# Patient Record
Sex: FEMALE | Race: WHITE | URBAN - METROPOLITAN AREA
[De-identification: names, ages, dates, MRNs, and addresses within clinical notes are randomized per-mention and may not be internally consistent; named-entity substitution may affect disease eponyms.]

---

## 2023-12-11 ENCOUNTER — OFFICE VISIT (OUTPATIENT)
Age: 48
End: 2023-12-11

## 2023-12-11 VITALS
DIASTOLIC BLOOD PRESSURE: 84 MMHG | OXYGEN SATURATION: 98 % | WEIGHT: 150 LBS | RESPIRATION RATE: 16 BRPM | HEART RATE: 75 BPM | TEMPERATURE: 98.2 F | SYSTOLIC BLOOD PRESSURE: 126 MMHG

## 2023-12-11 DIAGNOSIS — N39.0 URINARY TRACT INFECTION WITHOUT HEMATURIA, SITE UNSPECIFIED: Primary | ICD-10-CM

## 2023-12-11 LAB
BILIRUBIN, POC: NEGATIVE
BLOOD URINE, POC: NEGATIVE
CLARITY, POC: ABNORMAL
COLOR, POC: YELLOW
GLUCOSE URINE, POC: NEGATIVE
KETONES, POC: NEGATIVE
LEUKOCYTE EST, POC: ABNORMAL
NITRITE, POC: ABNORMAL
PH, POC: 7
PROTEIN, POC: NEGATIVE
SPECIFIC GRAVITY, POC: 1.01
UROBILINOGEN, POC: 0.2

## 2023-12-11 RX ORDER — FLUCONAZOLE 150 MG/1
TABLET ORAL
Qty: 2 TABLET | Refills: 0 | Status: SHIPPED | OUTPATIENT
Start: 2023-12-11

## 2023-12-11 RX ORDER — SULFAMETHOXAZOLE AND TRIMETHOPRIM 800; 160 MG/1; MG/1
1 TABLET ORAL 2 TIMES DAILY
Qty: 14 TABLET | Refills: 0 | Status: SHIPPED | OUTPATIENT
Start: 2023-12-11 | End: 2023-12-18

## 2023-12-11 ASSESSMENT — ENCOUNTER SYMPTOMS
DIARRHEA: 0
SHORTNESS OF BREATH: 0
BACK PAIN: 0
COUGH: 0
ABDOMINAL PAIN: 0
NAUSEA: 0
VOMITING: 0
SORE THROAT: 0

## 2024-01-09 ENCOUNTER — OFFICE VISIT (OUTPATIENT)
Age: 49
End: 2024-01-09

## 2024-01-09 VITALS
OXYGEN SATURATION: 95 % | HEART RATE: 85 BPM | WEIGHT: 144.2 LBS | TEMPERATURE: 97.9 F | RESPIRATION RATE: 20 BRPM | DIASTOLIC BLOOD PRESSURE: 80 MMHG | HEIGHT: 64 IN | BODY MASS INDEX: 24.62 KG/M2 | SYSTOLIC BLOOD PRESSURE: 164 MMHG

## 2024-01-09 DIAGNOSIS — N39.0 URINARY TRACT INFECTION WITHOUT HEMATURIA, SITE UNSPECIFIED: Primary | ICD-10-CM

## 2024-01-09 LAB
BILIRUBIN, POC: ABNORMAL
BLOOD URINE, POC: ABNORMAL
CLARITY, POC: ABNORMAL
COLOR, POC: ABNORMAL
GLUCOSE URINE, POC: ABNORMAL
KETONES, POC: ABNORMAL
LEUKOCYTE EST, POC: ABNORMAL
NITRITE, POC: ABNORMAL
PH, POC: 6
PROTEIN, POC: ABNORMAL
SPECIFIC GRAVITY, POC: 1.02
UROBILINOGEN, POC: 0.2

## 2024-01-09 RX ORDER — CIPROFLOXACIN 500 MG/1
500 TABLET, FILM COATED ORAL 2 TIMES DAILY
Qty: 14 TABLET | Refills: 0 | Status: SHIPPED | OUTPATIENT
Start: 2024-01-09 | End: 2024-01-16

## 2024-01-09 ASSESSMENT — ENCOUNTER SYMPTOMS
BACK PAIN: 0
VOMITING: 0
DIARRHEA: 0
ABDOMINAL DISTENTION: 0
EYE REDNESS: 0
COUGH: 0
ABDOMINAL PAIN: 0
SORE THROAT: 0
NAUSEA: 0

## 2024-01-09 NOTE — PROGRESS NOTES
Katty Mccauley (:  1975) is a 48 y.o. female,Established patient, here for evaluation of the following chief complaint(s):  Urinary Tract Infection (Pt c/o frequency and painful urination )      ASSESSMENT/PLAN:  1. Urinary tract infection without hematuria, site unspecified    - POCT Urinalysis no Micro  - ciprofloxacin (CIPRO) 500 MG tablet; Take 1 tablet by mouth 2 times daily for 7 days  Dispense: 14 tablet; Refill: 0     Pt was advised to increase water intake,f/u with her PCP    No follow-ups on file.    SUBJECTIVE/OBJECTIVE:    History provided by:  Patient  Urinary Tract Infection  This is a recurrent problem. The current episode started 1 to 4 weeks ago. The problem has been gradually worsening since onset. Pertinent negatives include no hematuria.       Vitals:    24 1412   BP: (!) 164/80   Pulse: 85   Resp: 20   Temp: 97.9 °F (36.6 °C)   SpO2: 95%   Weight: 65.4 kg (144 lb 3.2 oz)   Height: 1.626 m (5' 4\")       Review of Systems   Constitutional:  Negative for activity change, appetite change and chills.   HENT:  Negative for congestion and sore throat.    Eyes:  Negative for redness.   Respiratory:  Negative for cough.    Cardiovascular:  Negative for chest pain and leg swelling.   Gastrointestinal:  Negative for abdominal distention, abdominal pain, diarrhea, nausea and vomiting.   Genitourinary:  Positive for dysuria, frequency and urgency. Negative for difficulty urinating, dyspareunia, flank pain, genital sores, hematuria, vaginal discharge and vaginal pain.   Musculoskeletal:  Negative for back pain.   Skin:  Negative for rash.       Physical Exam  Constitutional:       General: She is not in acute distress.  HENT:      Nose: No congestion.      Mouth/Throat:      Mouth: Mucous membranes are moist.      Pharynx: No oropharyngeal exudate or posterior oropharyngeal erythema.   Eyes:      Conjunctiva/sclera: Conjunctivae normal.      Pupils: Pupils are equal, round, and reactive to light.

## 2024-02-20 ENCOUNTER — OFFICE VISIT (OUTPATIENT)
Age: 49
End: 2024-02-20

## 2024-02-20 VITALS
HEART RATE: 70 BPM | WEIGHT: 143.8 LBS | RESPIRATION RATE: 18 BRPM | OXYGEN SATURATION: 96 % | DIASTOLIC BLOOD PRESSURE: 84 MMHG | TEMPERATURE: 98 F | SYSTOLIC BLOOD PRESSURE: 137 MMHG | BODY MASS INDEX: 24.68 KG/M2

## 2024-02-20 DIAGNOSIS — M26.629 TMJ SYNDROME: Primary | ICD-10-CM

## 2024-02-20 RX ORDER — METOPROLOL TARTRATE 50 MG/1
TABLET, FILM COATED ORAL
COMMUNITY
Start: 2024-01-27

## 2024-02-20 RX ORDER — PRAVASTATIN SODIUM 80 MG/1
TABLET ORAL
COMMUNITY
Start: 2024-02-12

## 2024-02-20 RX ORDER — NAPROXEN 500 MG/1
500 TABLET ORAL 2 TIMES DAILY WITH MEALS
Qty: 60 TABLET | Refills: 0 | Status: SHIPPED | OUTPATIENT
Start: 2024-02-20

## 2024-02-20 ASSESSMENT — ENCOUNTER SYMPTOMS
COUGH: 0
ABDOMINAL PAIN: 0
EYE REDNESS: 0
VOMITING: 0
TROUBLE SWALLOWING: 0
VOICE CHANGE: 0
DIARRHEA: 0
SORE THROAT: 0
RHINORRHEA: 0
CHEST TIGHTNESS: 0

## 2024-02-20 NOTE — PROGRESS NOTES
Katty Mccauley (:  1975) is a 49 y.o. female,Established patient, here for evaluation of the following chief complaint(s):  Otalgia (Pt c/o lft ear pain x 2 days )      ASSESSMENT/PLAN:  1. TMJ syndrome    - naproxen (NAPROSYN) 500 MG tablet; Take 1 tablet by mouth 2 times daily (with meals)  Dispense: 60 tablet; Refill: 0     -f/u with her PCP if not better,return to  if worsening symptoms  No follow-ups on file.    SUBJECTIVE/OBJECTIVE:    History provided by:  Patient  Otalgia   There is pain in the left ear. This is a new problem. The current episode started in the past 7 days. The problem has been unchanged. There has been no fever. The pain is mild. Pertinent negatives include no abdominal pain, coughing, diarrhea, ear discharge, headaches, hearing loss, neck pain, rash, rhinorrhea, sore throat or vomiting. She has tried nothing for the symptoms.       Vitals:    24 1442   BP: 137/84   Pulse: 70   Resp: 18   Temp: 98 °F (36.7 °C)   SpO2: 96%   Weight: 65.2 kg (143 lb 12.8 oz)       Review of Systems   Constitutional:  Negative for activity change, appetite change, fatigue and fever.   HENT:  Positive for ear pain. Negative for congestion, ear discharge, hearing loss, rhinorrhea, sore throat, tinnitus, trouble swallowing and voice change.    Eyes:  Negative for redness.   Respiratory:  Negative for cough and chest tightness.    Cardiovascular:  Negative for chest pain.   Gastrointestinal:  Negative for abdominal pain, diarrhea and vomiting.   Musculoskeletal:  Negative for neck pain.   Skin:  Negative for rash.   Neurological:  Negative for dizziness and headaches.       Physical Exam  Constitutional:       General: She is not in acute distress.  HENT:      Right Ear: There is no impacted cerumen. Tympanic membrane is not erythematous or bulging.      Left Ear: There is no impacted cerumen. Tympanic membrane is not erythematous or bulging.      Ears:      Comments: Tender Left TMJ     Nose: No

## 2024-07-25 ENCOUNTER — OFFICE VISIT (OUTPATIENT)
Age: 49
End: 2024-07-25

## 2024-07-25 VITALS
HEART RATE: 82 BPM | WEIGHT: 140 LBS | BODY MASS INDEX: 24.03 KG/M2 | TEMPERATURE: 98.4 F | SYSTOLIC BLOOD PRESSURE: 123 MMHG | OXYGEN SATURATION: 94 % | DIASTOLIC BLOOD PRESSURE: 79 MMHG

## 2024-07-25 DIAGNOSIS — L03.90 CELLULITIS, UNSPECIFIED CELLULITIS SITE: ICD-10-CM

## 2024-07-25 DIAGNOSIS — L25.9 CONTACT DERMATITIS, UNSPECIFIED CONTACT DERMATITIS TYPE, UNSPECIFIED TRIGGER: Primary | ICD-10-CM

## 2024-07-25 RX ORDER — SULFAMETHOXAZOLE AND TRIMETHOPRIM 800; 160 MG/1; MG/1
1 TABLET ORAL 2 TIMES DAILY
Qty: 14 TABLET | Refills: 0 | Status: SHIPPED | OUTPATIENT
Start: 2024-07-25 | End: 2024-08-01

## 2024-07-25 RX ORDER — TRIAMCINOLONE ACETONIDE 0.25 MG/G
CREAM TOPICAL
Qty: 15 G | Refills: 0 | Status: SHIPPED | OUTPATIENT
Start: 2024-07-25

## 2024-07-25 RX ORDER — FLUCONAZOLE 150 MG/1
150 TABLET ORAL
Qty: 2 TABLET | Refills: 0 | Status: SHIPPED | OUTPATIENT
Start: 2024-07-25 | End: 2024-07-31

## 2024-07-25 NOTE — PROGRESS NOTES
Katty Mccauley (:  1975) is a 49 y.o. female,Established patient, here for evaluation of the following chief complaint(s):  Rash (Rash on abdomen - dr prescribed nystatin with no improvement - x 5-6 days)      Assessment & Plan :  Visit Diagnoses and Associated Orders       Contact dermatitis, unspecified contact dermatitis type, unspecified trigger    -  Primary    triamcinolone (KENALOG) 0.025 % cream [8112]           Cellulitis, unspecified cellulitis site        sulfamethoxazole-trimethoprim (BACTRIM DS;SEPTRA DS) 800-160 MG per tablet [88127]           ORDERS WITHOUT AN ASSOCIATED DIAGNOSIS    fluconazole (DIFLUCAN) 150 MG tablet [91553]             Do not scratch the rash. Cut your nails short, and file them smooth. Or wear gloves if this helps keep you from scratching.  Wash the area with water only. Pat dry.  Put cold, wet cloths on the rash to reduce itching.  Keep cool, and stay out of the sun.  Leave the rash open to the air as much as possible       Subjective :  Rash (Rash on abdomen - dr prescribed nystatin with no improvement - x 5-6 days)        History provided by:  Patient  Rash  This is a new problem. The current episode started in the past 7 days. The problem has been gradually worsening since onset. The affected locations include the abdomen (under breast). The rash is characterized by burning, itchiness, pain and redness. She was exposed to nothing. Pertinent negatives include no facial edema, fatigue, fever or joint pain. Treatments tried: nystatin cream. The treatment provided no relief.       49 y.o. female presents with symptoms of dermatitis between and under breast.          Vitals:    24 1130   BP: 123/79   Site: Right Upper Arm   Position: Sitting   Cuff Size: Medium Adult   Pulse: 82   Temp: 98.4 °F (36.9 °C)   TempSrc: Oral   SpO2: 94%   Weight: 63.5 kg (140 lb)       No results found for this visit on 24.      Objective   Physical Exam  Constitutional:

## 2024-07-25 NOTE — PATIENT INSTRUCTIONS
Discharge medications reviewed with the patient and appropriate educational materials and side effects teaching were provided.    Do not scratch the rash. Cut your nails short, and file them smooth. Or wear gloves if this helps keep you from scratching.  Wash the area with water only. Pat dry.  Put cold, wet cloths on the rash to reduce itching.  Keep cool, and stay out of the sun.  Leave the rash open to the air as much as possible    Complete the full course of antibiotics . Eat a yogurt daily while on antibiotics to prevent yeast infection. Use a second form of birth control while on antibiotics, as antibiotics often interfere with birth control effectiveness.

## 2025-01-29 ENCOUNTER — OFFICE VISIT (OUTPATIENT)
Age: 50
End: 2025-01-29
Payer: COMMERCIAL

## 2025-01-29 VITALS
SYSTOLIC BLOOD PRESSURE: 125 MMHG | HEIGHT: 64 IN | BODY MASS INDEX: 24.24 KG/M2 | TEMPERATURE: 96.8 F | WEIGHT: 141.98 LBS | HEART RATE: 78 BPM | OXYGEN SATURATION: 98 % | DIASTOLIC BLOOD PRESSURE: 80 MMHG

## 2025-01-29 DIAGNOSIS — R42 DIZZINESS: Primary | ICD-10-CM

## 2025-01-29 PROCEDURE — G8420 CALC BMI NORM PARAMETERS: HCPCS | Performed by: OTOLARYNGOLOGY

## 2025-01-29 PROCEDURE — 1036F TOBACCO NON-USER: CPT | Performed by: OTOLARYNGOLOGY

## 2025-01-29 PROCEDURE — G8427 DOCREV CUR MEDS BY ELIG CLIN: HCPCS | Performed by: OTOLARYNGOLOGY

## 2025-01-29 PROCEDURE — 99204 OFFICE O/P NEW MOD 45 MIN: CPT | Performed by: OTOLARYNGOLOGY

## 2025-01-29 PROCEDURE — 3017F COLORECTAL CA SCREEN DOC REV: CPT | Performed by: OTOLARYNGOLOGY

## 2025-01-29 RX ORDER — HYDROCHLOROTHIAZIDE 12.5 MG/1
12.5 CAPSULE ORAL DAILY PRN
COMMUNITY

## 2025-01-29 RX ORDER — LAMOTRIGINE 25 MG/1
TABLET ORAL
COMMUNITY
Start: 2025-01-03

## 2025-01-29 RX ORDER — MAGNESIUM OXIDE 400 MG/1
400 TABLET ORAL 2 TIMES DAILY
COMMUNITY
Start: 2024-06-23

## 2025-01-29 RX ORDER — CALCIUM CITRATE/VITAMIN D3 200MG-6.25
TABLET ORAL
COMMUNITY
Start: 2025-01-25

## 2025-01-29 RX ORDER — NYSTATIN 100000 U/G
30 CREAM TOPICAL PRN
COMMUNITY
Start: 2024-07-16

## 2025-01-29 RX ORDER — HYDROXYZINE HYDROCHLORIDE 10 MG/1
TABLET, FILM COATED ORAL
COMMUNITY
Start: 2024-12-31

## 2025-01-29 NOTE — PROGRESS NOTES
CHIEF COMPLAINT: Dizziness    HISTORY OF PRESENT ILLNESS:  49 y.o. female who presents with dizziness of 3 week duration. Comes and goes. Feels off kilter in her head.  Just doesn't feel right. Sometimes has nausea. Gets vertigo when she lies down. Hearing is subjectively symmetrical. No tinnitus.    PAST MEDICAL HISTORY:   Social History     Tobacco Use   Smoking Status Former    Types: Cigarettes   Smokeless Tobacco Never                                                    Social History     Substance and Sexual Activity   Alcohol Use Never                                                    Current Outpatient Medications:     TRUE METRIX BLOOD GLUCOSE TEST strip, , Disp: , Rfl:     hydroCHLOROthiazide 12.5 MG capsule, Take 1 capsule by mouth daily as needed, Disp: , Rfl:     hydrOXYzine HCl (ATARAX) 10 MG tablet, , Disp: , Rfl:     lamoTRIgine (LAMICTAL) 25 MG tablet, , Disp: , Rfl:     magnesium oxide (MAG-OX) 400 MG tablet, Take 1 tablet by mouth 2 times daily, Disp: , Rfl:     nystatin (MYCOSTATIN) 642339 UNIT/GM cream, Apply 30 g topically as needed, Disp: , Rfl:     triamcinolone (KENALOG) 0.025 % cream, Apply topically 2 times daily., Disp: 15 g, Rfl: 0    metFORMIN (GLUCOPHAGE) 1000 MG tablet, , Disp: , Rfl:     metoprolol tartrate (LOPRESSOR) 25 MG tablet, , Disp: , Rfl:     pravastatin (PRAVACHOL) 80 MG tablet, , Disp: , Rfl:     naproxen (NAPROSYN) 500 MG tablet, Take 1 tablet by mouth 2 times daily (with meals), Disp: 60 tablet, Rfl: 0    metoprolol tartrate (LOPRESSOR) 50 MG tablet, , Disp: , Rfl:                                                History reviewed. No pertinent past medical history.                                               History reviewed. No pertinent surgical history.  FAMILY HISTORY: Family history reviewed.  Except as noted in history of present illness, there is no pertinent family history      REVIEW OF SYSTEMS:  All pertinent positive and negative review of systems included in

## 2025-08-26 ENCOUNTER — OFFICE VISIT (OUTPATIENT)
Age: 50
End: 2025-08-26

## 2025-08-26 VITALS
WEIGHT: 139.4 LBS | HEART RATE: 94 BPM | TEMPERATURE: 97.4 F | OXYGEN SATURATION: 94 % | BODY MASS INDEX: 23.8 KG/M2 | SYSTOLIC BLOOD PRESSURE: 108 MMHG | DIASTOLIC BLOOD PRESSURE: 72 MMHG | HEIGHT: 64 IN

## 2025-08-26 DIAGNOSIS — B37.2 CANDIDIASIS OF SKIN: Primary | ICD-10-CM

## 2025-08-26 RX ORDER — CLOTRIMAZOLE AND BETAMETHASONE DIPROPIONATE 10; .64 MG/G; MG/G
CREAM TOPICAL
Qty: 45 G | Refills: 0 | Status: SHIPPED | OUTPATIENT
Start: 2025-08-26

## 2025-08-26 RX ORDER — HYDROCHLOROTHIAZIDE 12.5 MG/1
CAPSULE ORAL
COMMUNITY
Start: 2025-08-15

## 2025-08-26 ASSESSMENT — ENCOUNTER SYMPTOMS
DIARRHEA: 0
SHORTNESS OF BREATH: 0
SORE THROAT: 0
COUGH: 0
RHINORRHEA: 0
VOMITING: 0